# Patient Record
Sex: FEMALE | Race: BLACK OR AFRICAN AMERICAN
[De-identification: names, ages, dates, MRNs, and addresses within clinical notes are randomized per-mention and may not be internally consistent; named-entity substitution may affect disease eponyms.]

---

## 2017-03-26 ENCOUNTER — HOSPITAL ENCOUNTER (EMERGENCY)
Dept: HOSPITAL 74 - JER | Age: 22
Discharge: HOME | End: 2017-03-26
Payer: COMMERCIAL

## 2017-03-26 VITALS — HEART RATE: 79 BPM | SYSTOLIC BLOOD PRESSURE: 130 MMHG | DIASTOLIC BLOOD PRESSURE: 77 MMHG

## 2017-03-26 VITALS — BODY MASS INDEX: 32.5 KG/M2

## 2017-03-26 VITALS — TEMPERATURE: 98.1 F

## 2017-03-26 DIAGNOSIS — R06.02: Primary | ICD-10-CM

## 2017-03-26 LAB
ALBUMIN SERPL-MCNC: 3.6 G/DL (ref 3.4–5)
ALP SERPL-CCNC: 71 U/L (ref 45–117)
ALT SERPL-CCNC: 149 U/L (ref 12–78)
ANION GAP SERPL CALC-SCNC: 6 MMOL/L (ref 8–16)
APPEARANCE UR: CLEAR
AST SERPL-CCNC: 48 U/L (ref 15–37)
BASOPHILS # BLD: 0.6 % (ref 0–2)
BILIRUB SERPL-MCNC: 0.2 MG/DL (ref 0.2–1)
BILIRUB UR STRIP.AUTO-MCNC: NEGATIVE MG/DL
CALCIUM SERPL-MCNC: 8.4 MG/DL (ref 8.5–10.1)
CO2 SERPL-SCNC: 26 MMOL/L (ref 21–32)
COLOR UR: (no result)
CREAT SERPL-MCNC: 0.8 MG/DL (ref 0.55–1.02)
DEPRECATED RDW RBC AUTO: 15.5 % (ref 11.6–15.6)
EOSINOPHIL # BLD: 10.2 % (ref 0–4.5)
GLUCOSE SERPL-MCNC: 88 MG/DL (ref 74–106)
KETONES UR QL STRIP: NEGATIVE
LEUKOCYTE ESTERASE UR QL STRIP.AUTO: NEGATIVE
MCH RBC QN AUTO: 25.4 PG (ref 25.7–33.7)
MCHC RBC AUTO-ENTMCNC: 31.9 G/DL (ref 32–36)
MCV RBC: 79.6 FL (ref 80–96)
NEUTROPHILS # BLD: 37.5 % (ref 42.8–82.8)
NITRITE UR QL STRIP: NEGATIVE
PH UR: 6 [PH] (ref 5–8)
PLATELET # BLD AUTO: 305 K/MM3 (ref 134–434)
PMV BLD: 8.4 FL (ref 7.5–11.1)
PROT SERPL-MCNC: 7.5 G/DL (ref 6.4–8.2)
PROT UR QL STRIP: NEGATIVE
PROT UR QL STRIP: NEGATIVE
RBC # UR STRIP: NEGATIVE /UL
SP GR UR: 1.02 (ref 1–1.03)
TSH SERPL-ACNC: 0.6 UIU/ML (ref 0.36–3.74)
UROBILINOGEN UR STRIP-MCNC: NEGATIVE E.U./DL (ref 0.2–1)
WBC # BLD AUTO: 5.3 K/MM3 (ref 4–10)

## 2017-03-26 NOTE — PDOC
[id number]   100 


 


 03/26/17 06:35  03/26/17 06:35  03/26/17 06:35  03/26/17 06:35  03/26/17 06:35














- Physical Exam


General Appearance: Yes: Nourished, Appropriately Dressed.  No: Apparent 

Distress


HEENT: positive: Normal Voice


Neck: positive: Supple


Respiratory/Chest: positive: Lungs Clear, Normal Breath Sounds.  negative: 

Respiratory Distress


Cardiovascular: positive: Regular Rate, S1, S2


Gastrointestinal/Abdominal: positive: Soft.  negative: Tender


Integumentary: positive: Dry, Warm


Neurologic: positive: Fully Oriented, Alert, Normal Mood/Affect





<Oc Albarran - Last Filed: 03/26/17 08:29>





- Vital Signs


 Last Vital Signs











Temp Pulse Resp BP Pulse Ox


 


 98.1 F   79   17   130/77   99 


 


 03/26/17 08:33  03/26/17 08:33  03/26/17 08:33  03/26/17 08:33  03/26/17 08:33














<Jamey Victoria - Last Filed: 03/27/17 05:20>





**Heart Score/ECG Review





- ECG Intrepretation


Comment:: 





03/26/17 08:05


Twelve-lead EKG was performed and reviewed by me. There is normal sinus rhythm 

with a normal rate. The axis is normal. The intervals are normal. There are no 

ST or T wave abnormalities.





Impression: Normal twelve-lead EKG





<Oc Albarran - Last Filed: 03/26/17 08:29>





ED Treatment Course





- LABORATORY


CBC & Chemistry Diagram: 


 03/26/17 07:58





 03/26/17 07:58





<Oc Albarran - Last Filed: 03/26/17 08:29>





- LABORATORY


CBC & Chemistry Diagram: 


 03/26/17 07:58





 03/26/17 07:58





- ADDITIONAL ORDERS


Additional order review: 


 











  03/26/17





  07:58


 


RBC  4.90  D


 


MCV  79.6 L


 


MCHC  31.9 L


 


RDW  15.5  D


 


MPV  8.4


 


Neutrophils %  37.5 L D


 


Lymphocytes %  40.8 H D


 


Monocytes %  10.9 H


 


Eosinophils %  10.2 H D


 


Basophils %  0.6














<Jamey Victoria - Last Filed: 03/27/17 05:20>





Medical Decision Making





- Medical Decision Making


03/26/17 07:39





03/26/17 06:57


Received S.O from night NP





22 yo F, no sig p,hx, here w/ chronic complaints including sob since depot shot 

3 months ago, states sob is worse at nights and that her BF has had to wake her 

up from sleep many times 2/2 breathing cessation per pt. Also report heart 

skipping beat and dizziness intermittently. Seeking medical care for first time 

now because sxs worsened yesterday. For unclear reasons has not f/u with a PMD 

for sxs. pt well gene and stable w/ unremarkable exam. EKG ordered by night team

, will get basic labs and TSH. Anticipate discharge w/ PMD f/u to eval for 

possible sleep apnea (pt mildly overweight). Will also refer to pt's gyn to 

discuss possible rxn to depot given hpi








03/26/17 08:05








03/26/17 08:35


Patient requesting to be discharged despite chemistry pending.  States she will 

contact ER for results later.  Patient told to follow-up with her GYN and PMD





<Oc Albarran - Last Filed: 03/26/17 08:29>





- Medical Decision Making





03/27/17 05:19


ED attending note: I was available for involvement and discussion in this case 

as needed.





<Jamey Victoria - Last Filed: 03/27/17 05:20>





*DC/Admit/Observation/Transfer





<Oc Albarran - Last Filed: 03/26/17 08:29>





<Jamey Victoria - Last Filed: 03/27/17 05:20>


Diagnosis at time of Disposition: 


 SOB (shortness of breath)





- Discharge Dispostion


Disposition: HOME


Condition at time of disposition: Good





- Referrals


Referrals: 


Fabiana Ragland MD [Primary Care Provider] - 





- Patient Instructions


Additional Instructions: 


Please follow-up at [facility] at [phone] 


Also follow with gyn to discuss your depot

## 2017-03-26 NOTE — EKG
Test Reason : 

Blood Pressure : ***/*** mmHG

Vent. Rate : 066 BPM     Atrial Rate : 066 BPM

   P-R Int : 180 ms          QRS Dur : 088 ms

    QT Int : 412 ms       P-R-T Axes : 039 059 035 degrees

   QTc Int : 431 ms

 

NORMAL SINUS RHYTHM WITH SINUS ARRHYTHMIA

NORMAL ECG

WHEN COMPARED WITH ECG OF 24-APR-2014 10:24,

VENT. RATE HAS DECREASED BY  42 BPM

Confirmed by AGUSTIN ABRAHAM MD ([id number]) on 3/26/2017 10:28:22 PM

 

Referred By:             Confirmed By:AGUSTIN ABRAHAM MD

## 2018-05-17 ENCOUNTER — HOSPITAL ENCOUNTER (EMERGENCY)
Dept: HOSPITAL 74 - JER | Age: 23
Discharge: HOME | End: 2018-05-17
Payer: COMMERCIAL

## 2018-05-17 VITALS — DIASTOLIC BLOOD PRESSURE: 58 MMHG | SYSTOLIC BLOOD PRESSURE: 120 MMHG | TEMPERATURE: 98.8 F | HEART RATE: 84 BPM

## 2018-05-17 VITALS — BODY MASS INDEX: 37.4 KG/M2

## 2018-05-17 DIAGNOSIS — K08.89: Primary | ICD-10-CM

## 2018-05-17 NOTE — PDOC
Rapid Medical Evaluation


Time Seen by Provider: 05/17/18 17:30


Medical Evaluation: 


 Allergies











Allergy/AdvReac Type Severity Reaction Status Date / Time


 


No Known Drug Allergies Allergy   Verified 03/26/17 06:42


 


Black eyed peas Allergy Mild Rash Uncoded 03/26/17 06:42











05/17/18 17:30


The patient presents with a chief complaint of: Toothache for three days. Pt. 

states she was seen at her dentist and was diagnosed with an impacted R lower 

wisdom tooth. Started on amoxicillin.  States that the gum is swollen and it 

hurts to swallow. Denies fevers, chills, SOB, n/v/d.





Pertinent physical exam findings:  AAOX3 sitting on chair. Ambulatory, in no 

respiratory distress, Posterior oropharynx with no erythema. Tonsils without 

erthema or exudate. No uvular deviation. R lower wisdom tooth impacted with 

small fluctuance, possible abscess. Very tender to the touch


I have ordered the following: Tylenol with codeine for pain with relief of 

symptoms





Pt. is a 24 y/o F with no PMH who presents to the ED with dental pain for three 

days. Was evaluated at her dentist office however, they were unable to pull the 

tooth today. Sent RX for tylenol with codeine. Will d./c home with follow up to 

[facility]. Pt. is going there now. Return precautions given. Pt. 

understands all dc instructions and all questions were answered.














**Discharge Disposition





- Diagnosis


 Pain, dental








- Discharge Dispostion


Disposition: HOME


Condition at time of disposition: Stable


Decision to Admit order: No





- Prescriptions


Prescriptions: 


Acetaminophen W/ Codeine Liq [Tylenol W/Codeine Oral Solution -] 5 ml PO Q6H 

PRN #120 ml MDD 4


 PRN Reason: Pain





- Referrals





- Patient Instructions


Printed Discharge Instructions:  DI for Dental Pain


Additional Instructions: 


You were evaluated for dental pain today.


Please go to [facility] now for further evaluation of your 

pain. Information below. 


Take the antibiotics as previously prescribed.


You may take Tylenol with codeine as needed for pain. Do not drive after taking 

this medication as it may make you sleepy.





Return to the ED if you develop fevers, chills, worsening pain, headaches or 

have any changes in your symptoms.





[facility]


[address] [place] [address], [id number] [phone] 





- Post Discharge Activity


Work/School Note:  Back to Work

## 2018-05-24 ENCOUNTER — HOSPITAL ENCOUNTER (EMERGENCY)
Dept: HOSPITAL 74 - JERFT | Age: 23
Discharge: HOME | End: 2018-05-24
Payer: COMMERCIAL

## 2018-05-24 VITALS — TEMPERATURE: 98 F | SYSTOLIC BLOOD PRESSURE: 118 MMHG | DIASTOLIC BLOOD PRESSURE: 59 MMHG | HEART RATE: 69 BPM

## 2018-05-24 VITALS — BODY MASS INDEX: 37.4 KG/M2

## 2018-05-24 DIAGNOSIS — Z98.818: ICD-10-CM

## 2018-05-24 DIAGNOSIS — K08.89: Primary | ICD-10-CM

## 2018-05-24 PROCEDURE — 3E0233Z INTRODUCTION OF ANTI-INFLAMMATORY INTO MUSCLE, PERCUTANEOUS APPROACH: ICD-10-PCS

## 2018-05-24 NOTE — PDOC
History of Present Illness





- General


Chief Complaint: Pain


Stated Complaint: PAIN/ HEAD, MOUTH


Time Seen by Provider: 18 11:30


History Source: Patient


Exam Limitations: No Limitations





- History of Present Illness


Initial Comments: 





18 11:43


Patient is a 23-year-old female with no past medical history who presents 

emergency Department with right lower tooth pain. Patient was evaluated in the 

emergency department on 18 with dental pain by myself. Patient had 

impacted right lower wisdom tooth at that time. Patient was sent to the dental 

urgent care for further evaluation of her tooth pain. She had three wisdom 

teeth removed at that time on 18. Since then she has had worsening dental 

pain and headache. Denies fevers, chills, n/v/d. Still taking her amoxicllin.





Past History





- Travel


Traveled outside of the country in the last 30 days: No


Close contact w/someone who was outside of country & ill: No





- Past Medical History


Allergies/Adverse Reactions: 


 Allergies











Allergy/AdvReac Type Severity Reaction Status Date / Time


 


No Known Drug Allergies Allergy   Verified 18 11:09


 


Black eyed peas Allergy Mild Rash Uncoded 18 11:09











Home Medications: 


Ambulatory Orders





Acetaminophen W/ Codeine Liq [Tylenol W/Codeine Oral Solution -] 5 ml PO Q6H 

PRN #120 ml MDD 4 18 


Acetaminophen W/ Codeine Liq [Tylenol W/Codeine Oral Solution -] 5 ml PO Q6H #

200 ml MDD 4 18 








Anemia: Yes


Asthma: Yes


Cancer: No


Cardiac Disorders: No


COPD: No


Diabetes: No


HTN: No


Seizures: No


Thyroid Disease: No





- Reproductive History


 (#): 2


Para: 1


Therapeutic (s) & number: Yes (1)





- Immunization History


Immunization Up to Date: Yes





- Suicide/Smoking/Psychosocial Hx


Smoking Status: No


Smoking History: Never smoked


Have you smoked in the past 12 months: No


Number of Cigarettes Smoked Daily: 0


Information on smoking cessation initiated: No


Hx Alcohol Use: No


Drug/Substance Use Hx: No


Substance Use Type: None


Hx Substance Use Treatment: No





**Review of Systems





- Review of Systems


Able to Perform ROS?: Yes


Comments:: 





18 11:33


CONSTITUTIONAL: 


Absent: fever, chills, diaphoresis, generalized weakness, malaise, loss of 

appetite


HEENT: 


Present: dental pain Absent: rhinorrhea, nasal congestion, throat pain, throat 

swelling, difficulty swallowing, mouth swelling, ear pain, eye pain, visual 

Changes


SKIN: 


Absent: rash, itching, pallor


NEUROLOGIC: 


Present: headache Absent: headache, focal weakness or paresthesias, dizziness, 

unsteady gait, seizure, mental status changes, bladder or bowel incontinence








Is the patient limited English proficient: No





*Physical Exam





- Vital Signs


 Last Vital Signs











Temp Pulse Resp BP Pulse Ox


 


 98.0 F   69   16   118/59   100 


 


 18 11:09  18 11:09  18 11:09  18 11:09  18 11:09














- Physical Exam


Comments: 





18 12:04


GENERAL:


Well developed, well nourished. Awake and alert. No acute distress.


HEENT:


Normocephalic, atraumatic. PERRLA, EOMI. No conjunctival pallor. Sclera are non-

icteric. Moist mucous membranes. Oropharynx is clear. Still with swelling to 

the R lower gum near the wisdom tooth site. Unable to appreciate any socket.


NECK: 


Supple. Full ROM. No JVD. Carotid pulses 2+ and symmetric, without bruits. No 

thyromegaly. No lymphadenopathy.


SKIN: 


Warm and dry. Normal capillary refill. No rashes. No jaundice. 


NEUROLOGICAL: 


Alert, awake, appropriate. Cranial nerves 2-12 intact. No deficits to light 

touch and temperature in face, upper extremities and lower extremities. No 

motor deficits in the in face, upper extremities and lower extremities. 

Normoreflexic in the upper and lower extremities. Normal speech. Toes are down-

going bilaterally. Gait is normal without ataxia.








Medical Decision Making





- Medical Decision Making





18 12:15


Patient is a 23-year-old female who presents emergency Department with 

increasing dental pain after wisdom tooth extraction 4 days ago. On exam 

difficult to appreciate any sockets due to swelling. Positive halitosis. 

Patient still on antibiotics. Some concern for a dry socket at this time. 

Toradol given for pain. Instructed patient that she needs to return to the 

dentist immediately for further evaluation. Referral for [facility] 

dental given again. Return precautions given. Patient stands all discharge 

instructions and all questions were answered.





*DC/Admit/Observation/Transfer


Diagnosis at time of Disposition: 


 Pain, dental








- Discharge Dispostion


Disposition: HOME


Condition at time of disposition: Stable


Decision to Admit order: No





- Prescriptions


Prescriptions: 


Acetaminophen W/ Codeine Liq [Tylenol W/Codeine Oral Solution -] 5 ml PO Q6H #

200 ml MDD 4





- Referrals


Referrals: 


Azael Obrien MD [Primary Care Provider] - 





- Patient Instructions


Printed Discharge Instructions:  DI for Dental Pain


Additional Instructions: 


Your evaluated for your dental pain today.


It is very important that she follow-up with a dentist as soon as possible.


Please use warm water salt gargles to help clean out the mouth.


You may take Tylenol with codeine every 6 hours as needed for pain.





Return to emergency department if you have worsening pain, fevers, chills, or 

have any changes in your symptoms.





Dental urgent care


Address: [address] [place], [place] [address]


Phone: [phone]





- Post Discharge Activity


Forms/Work/School Notes:  Back to Work

## 2018-09-29 ENCOUNTER — HOSPITAL ENCOUNTER (EMERGENCY)
Dept: HOSPITAL 74 - JER | Age: 23
Discharge: HOME | End: 2018-09-29
Payer: COMMERCIAL

## 2018-09-29 VITALS — BODY MASS INDEX: 36.6 KG/M2

## 2018-09-29 VITALS — HEART RATE: 69 BPM | SYSTOLIC BLOOD PRESSURE: 109 MMHG | DIASTOLIC BLOOD PRESSURE: 65 MMHG

## 2018-09-29 VITALS — TEMPERATURE: 98 F

## 2018-09-29 DIAGNOSIS — Z87.09: ICD-10-CM

## 2018-09-29 DIAGNOSIS — E86.0: ICD-10-CM

## 2018-09-29 DIAGNOSIS — G40.909: ICD-10-CM

## 2018-09-29 DIAGNOSIS — K29.70: Primary | ICD-10-CM

## 2018-09-29 LAB
ALBUMIN SERPL-MCNC: 3.5 G/DL (ref 3.4–5)
ALP SERPL-CCNC: 47 U/L (ref 45–117)
ALT SERPL-CCNC: 32 U/L (ref 13–61)
ANION GAP SERPL CALC-SCNC: 10 MMOL/L (ref 8–16)
APPEARANCE UR: CLEAR
AST SERPL-CCNC: 21 U/L (ref 15–37)
BASOPHILS # BLD: 0.6 % (ref 0–2)
BILIRUB SERPL-MCNC: 0.3 MG/DL (ref 0.2–1)
BILIRUB UR STRIP.AUTO-MCNC: NEGATIVE MG/DL
BUN SERPL-MCNC: 12 MG/DL (ref 7–18)
CALCIUM SERPL-MCNC: 9 MG/DL (ref 8.5–10.1)
CHLORIDE SERPL-SCNC: 108 MMOL/L (ref 98–107)
CO2 SERPL-SCNC: 24 MMOL/L (ref 21–32)
COLOR UR: YELLOW
CREAT SERPL-MCNC: 0.7 MG/DL (ref 0.55–1.3)
DEPRECATED RDW RBC AUTO: 12.8 % (ref 11.6–15.6)
EOSINOPHIL # BLD: 5.6 % (ref 0–4.5)
GLUCOSE SERPL-MCNC: 80 MG/DL (ref 74–106)
HCT VFR BLD CALC: 39.9 % (ref 32.4–45.2)
HGB BLD-MCNC: 12.9 GM/DL (ref 10.7–15.3)
KETONES UR QL STRIP: NEGATIVE
LEUKOCYTE ESTERASE UR QL STRIP.AUTO: NEGATIVE
LIPASE SERPL-CCNC: 223 U/L (ref 73–393)
LYMPHOCYTES # BLD: 49.8 % (ref 8–40)
MCH RBC QN AUTO: 27.3 PG (ref 25.7–33.7)
MCHC RBC AUTO-ENTMCNC: 32.2 G/DL (ref 32–36)
MCV RBC: 84.7 FL (ref 80–96)
MONOCYTES # BLD AUTO: 9.9 % (ref 3.8–10.2)
NEUTROPHILS # BLD: 34.1 % (ref 42.8–82.8)
NITRITE UR QL STRIP: NEGATIVE
PH UR: 6 [PH] (ref 5–8)
PLATELET # BLD AUTO: 272 K/MM3 (ref 134–434)
PMV BLD: 9.8 FL (ref 7.5–11.1)
POTASSIUM SERPLBLD-SCNC: 3.9 MMOL/L (ref 3.5–5.1)
PROT SERPL-MCNC: 7.3 G/DL (ref 6.4–8.2)
PROT UR QL STRIP: NEGATIVE
PROT UR QL STRIP: NEGATIVE
RBC # BLD AUTO: 4.72 M/MM3 (ref 3.6–5.2)
SODIUM SERPL-SCNC: 141 MMOL/L (ref 136–145)
SP GR UR: 1.03 (ref 1–1.03)
UROBILINOGEN UR STRIP-MCNC: 2 MG/DL (ref 0.2–1)
WBC # BLD AUTO: 5.5 K/MM3 (ref 4–10)

## 2018-09-29 PROCEDURE — 3E033GC INTRODUCTION OF OTHER THERAPEUTIC SUBSTANCE INTO PERIPHERAL VEIN, PERCUTANEOUS APPROACH: ICD-10-PCS | Performed by: EMERGENCY MEDICINE

## 2018-09-29 PROCEDURE — 3E033NZ INTRODUCTION OF ANALGESICS, HYPNOTICS, SEDATIVES INTO PERIPHERAL VEIN, PERCUTANEOUS APPROACH: ICD-10-PCS | Performed by: EMERGENCY MEDICINE

## 2018-09-29 NOTE — PDOC
History of Present Illness





- General


Chief Complaint: Seizure


Stated Complaint: SEIZURES





- History of Present Illness


Initial Comments: 





18 19:09


22 y/o F w/ PMH of asthma, scoliosis s/p corrective surgery 2005, c-sections x3

, gestational DM, seizures (psychogenic?), p/w frontal HA, n/v, and burning 

epigastric pain x1d. Pt sxs started this morning. pt endorses nausea, 

lightheadedness, and 4 episodes of brownish vomit (non bloody) w/ metallic and 

acidic taste in 4th episode. pt did not eat that much today and says she had 

some coffee this morning. pt also endorses some mild pressure feeling in her 

chest substernaly radiating to epigastrium, pt says this does not feel like her 

heart burn. pt decided  to call 911 bec she felt like she was going to have a 

seizure. Pt has hx of seizures and says she has been w/u at [facility] w/ EEG and 

MRI which were normal. Her neurologist told her it may be psychogenic. Pt says  

usually associated w/ stress and that she has been more stressed recently. 





Of note pt endorses some blurry vision, polyuria, and increase in thirst during 

the past week.





Denies fevers, diarrhea, numbness, tingling, dysuria, hematuria, blood in stools

, cough, chills, recent travel





PMH: PCP Azael Obrien, Neurologist Dr. Santiago at [facility] [address]. 1st 

seizure 5 yrs ago, last seizure 4 mo ago. 


Seizure episodes consist of foaming at mouth tounge biting whole body shaking/

tensing up. one time had incontinence





Meds: birth control pill


SH: inocenciaana, 1/2 pk/d for 6mo now down to 1-2 cig/day. denies etoh


FH: DM, seizure (cousin) 











18 20:59


pt tolerating PO


pt is safe and stable for discharge





Past History





- Past Medical History


Allergies/Adverse Reactions: 


 Allergies











Allergy/AdvReac Type Severity Reaction Status Date / Time


 


No Known Drug Allergies Allergy   Verified 18 17:38


 


Black eyed peas Allergy Mild Rash Uncoded 18 17:38











Home Medications: 


Ambulatory Orders





NK [No Known Home Medication]  18 








Anemia: Yes


Asthma: Yes


Cancer: No


Cardiac Disorders: No


COPD: No


Diabetes: No


HTN: No


Seizures: Yes


Thyroid Disease: No





- Reproductive History


 (#): 2


Para: 1


Therapeutic (s) & number: Yes (1)





- Immunization History


Immunization Up to Date: Yes





- Suicide/Smoking/Psychosocial Hx


Smoking Status: No


Smoking History: Current every day smoker


Have you smoked in the past 12 months: No


Number of Cigarettes Smoked Daily: 2


Information on smoking cessation initiated: No


Hx Alcohol Use: No


Drug/Substance Use Hx: No


Substance Use Type: None


Hx Substance Use Treatment: No





**Review of Systems





- Review of Systems


Constitutional: Yes: See HPI


HEENTM: Yes: See HPI


Respiratory: Yes: See HPI


Cardiac (ROS): Yes: See HPI


ABD/GI: Yes: See HPI


: Yes: See HPI


Musculoskeletal: Yes: See HPI


Integumentary: Yes: See HPI


Neurological: Yes: See HPI


Endocrine: Yes: See HPI


Hematologic/Lymphatic: Yes: See HPI





*Physical Exam





- Vital Signs


 Last Vital Signs











Temp Pulse Resp BP Pulse Ox


 


 98 F   59 L  18   109/58 L  100 


 


 18 17:38  18 17:38  18 17:38  18 17:38  18 17:38














- Physical Exam


Comments: 





18 19:29


GENERAL: AOX3, NAD   


HEENT: NCAT PERRLA, EOMI, sclera anicteric, oropharynx clear without exudates. 

MMM


NECK: Normal range of motion, no JVD, 


LUNGS: CTAB


HEART: RRR, normal S1 and S2 without M/R/G


ABDOMEN: Soft, ND, TTP epigastric +BS


MUSCULOSKELETAL: FROM 


UPPER EXTREMITIES: 2+ pulses, warm, well-perfused. No cyanosis. No clubbing. No 

peripheral edema.


LOWER EXTREMITIES: 2+ pulses, warm, well-perfused. No peripheral edema. 


NEUROLOGICAL:  Cranial nerves II-XII intact. Normal speech. no sensory 

deficits. stregth intact


SKIN: Warm, dry, normal turgor, no rashes or lesions noted, normal capillary 

refill.





ED Treatment Course





- LABORATORY


CBC & Chemistry Diagram: 


 18 18:48





 18 18:48





- ADDITIONAL ORDERS


Additional order review: 


 











  18





  18:48


 


RBC  4.72


 


MCV  84.7


 


MCHC  32.2


 


RDW  12.8  D


 


MPV  9.8  D


 


Neutrophils %  34.1 L


 


Lymphocytes %  49.8 H D


 


Monocytes %  9.9


 


Eosinophils %  5.6 H


 


Basophils %  0.6














Medical Decision Making





- Medical Decision Making





18 19:45


22 y/o F w/ PMH of asthma, scoliosis s/p corrective surgery 2005, c-sections x3

, gestational DM, seizures (psychogenic?), p/w frontal HA, n/v, and burning 

epigastric pain x1d.





Ddx: psychogenic seizure prodrome vs dehydration vs gastroenteritis vs 

pancreatitis vs DM vs MI





-CBC, CMP, lipase, UA, EKG


-IVF bolus


-IV reglan, pepcid, tylenol


-pregnancy test











18 20:51


labs unremarkable


neg preg


UA neg


pt feeling better and requesting food. 








*DC/Admit/Observation/Transfer


Diagnosis at time of Disposition: 


 Dehydration, Gastritis








- Discharge Dispostion


Disposition: HOME


Condition at time of disposition: Stable





- Referrals


Referrals: 


Azael Obrien MD [Primary Care Provider] - 





- Patient Instructions


Additional Instructions: 


you were seen in the ER for headache, nausea, vomiting, and abdominal pain.





your labs were normal





We gave you fluids, Tylenol, and anti nausea and acid medications which helped 

relieve your symptoms. 





Please avoid spicy foods and coffee, as this may worsen your symptoms


Please take tums, maalox, and pepcid for heart burn or stomach upset





Please follow up with your primary care physician within 1 week. 





If you experience worsening of headache, nausea, vomiting, and abdominal pain 

or fevers, chills, blood in stools, blood in vomit, diarrhea, chest pain, 

shortness of breath, or seizures, please come back to the ER or call 911.  





- Post Discharge Activity

## 2018-09-29 NOTE — PDOC
Attending Attestation





- HPI


HPI: 





18 20:37





The patient is a 23 year old female with a significant PMH of  (x3), 

asthma and seizures who presents to the emergency department with seizure-like 

symptoms earlier today. The patient states that she was at home earlier today 

when she started to experience some epigastric pain, headache, and  

lightheadedness. She states that she has had seizures in the past and, these 

symptoms were similar to those she usually has before she experiences her 

seizure. The patient also reports some dull chest pressure that radiates to her 

epigastric region. She states that she has been experiencing some increased 

thirst and urination this week. She denies any other symptoms. She denies any 

fever, chills, nausea, vomiting, diarrhea, constipation or other urinary 

symptoms. She denies any shortness of breath,or dizziness. The patient denies 

any other complaints.  


 


PCP: Azael Nam








- Physicial Exam


PE: 





18 20:38


GENERAL: Awake, alert, and fully oriented, in no acute distress


HEAD: No signs of trauma


EYES: PERRLA, EOMI, sclera anicteric, conjunctiva clear


ENT: Auricles normal inspection, hearing grossly normal, nares patent, 

oropharynx clear without exudates. Moist mucosa


NECK: Normal ROM, supple, no lymphadenopathy, JVD, or masses


LUNGS: Breath sounds equal, clear to auscultation bilaterally.  No wheezes, and 

no crackles


HEART: Regular rate and rhythm, normal S1 and S2, no murmurs, rubs or gallops


ABDOMEN: (+)tender to palpation.Soft, normoactive bowel sounds.  No guarding, 

no rebound.  No masses


EXTREMITIES: Normal range of motion, no edema.  No clubbing or cyanosis. No 

cords, erythema, or tenderness


NEUROLOGICAL: Cranial nerves II through XII grossly intact.  Normal speech, 

normal gait


SKIN: Warm, Dry, normal turgor, no rashes or lesions noted.








Documentation prepared by Alan Hardy, acting as medical scribe for Rufina Hernandez MD.








<Alan Hardy - Last Filed: 18 20:37>





- Resident


Resident Name: Stanley Mejia





- ED Attending Attestation


I have performed the following: I have examined & evaluated the patient, The 

case was reviewed & discussed with the resident, I agree w/resident's findings 

& plan





- Medical Decision Making





18 20:01


Pt will have labs and hydration and meds for nausea and headache. 


She will be discharged if all labs are normal.


18 20:58


Labs normal; pt feels better; she was hydrated and she ate some food; Pt is 

ready to go home.





<Rufina Hernandez - Last Filed: 18 21:00>

## 2018-10-01 NOTE — EKG
Test Reason : 

Blood Pressure : ***/*** mmHG

Vent. Rate : 056 BPM     Atrial Rate : 056 BPM

   P-R Int : 166 ms          QRS Dur : 080 ms

    QT Int : 468 ms       P-R-T Axes : 029 058 043 degrees

   QTc Int : 451 ms

 

SINUS BRADYCARDIA WITH SINUS ARRHYTHMIA

EARLY REPOLARIZATION

OTHERWISE NORMAL ECG

WHEN COMPARED WITH ECG OF 26-MAR-2017 07:29,

NO SIGNIFICANT CHANGE WAS FOUND

Confirmed by AGUSTIN ABRAHAM MD ([id number]) on 10/1/2018 6:29:25 AM

 

Referred By:             Confirmed By:AGUSTIN ABRAHAM MD

## 2019-01-21 ENCOUNTER — HOSPITAL ENCOUNTER (EMERGENCY)
Dept: HOSPITAL 74 - JERFT | Age: 24
Discharge: HOME | End: 2019-01-21
Payer: COMMERCIAL

## 2019-01-21 VITALS — HEART RATE: 101 BPM | DIASTOLIC BLOOD PRESSURE: 62 MMHG | SYSTOLIC BLOOD PRESSURE: 138 MMHG

## 2019-01-21 VITALS — TEMPERATURE: 102.1 F

## 2019-01-21 VITALS — BODY MASS INDEX: 38.8 KG/M2

## 2019-01-21 DIAGNOSIS — J11.1: Primary | ICD-10-CM

## 2019-01-21 DIAGNOSIS — Z87.09: ICD-10-CM

## 2019-01-21 DIAGNOSIS — Z86.2: ICD-10-CM

## 2019-01-21 NOTE — PDOC
History of Present Illness





- General


Chief Complaint: Headache


Stated Complaint: HEADACHE/COUGHING


Time Seen by Provider: 19 14:25


History Source: Patient


Exam Limitations: No Limitations





- History of Present Illness


Initial Comments: 





19 14:44


Patient came for evaluation of fevers, chills, body aches that started 

yesterday and progressively worsening. States has a headache that the 

wraparound type headache and feels worse in her sinuses. Has a runny nose and a 

moist nonproductive cough.


Timing/Duration: 24 hours


Severity: mild, moderate


Associated Symptoms: reports: chest pain, fever/chills, headaches, malaise, 

nausea/vomiting





Past History





- Travel


Traveled outside of the country in the last 30 days: No


Close contact w/someone who was outside of country & ill: No





- Past Medical History


Allergies/Adverse Reactions: 


 Allergies











Allergy/AdvReac Type Severity Reaction Status Date / Time


 


No Known Drug Allergies Allergy   Verified 18 17:38


 


Black eyed peas Allergy Mild Rash Uncoded 18 17:38











Home Medications: 


Ambulatory Orders





Oseltamivir Phosphate [Tamiflu -] 75 mg PO BID #10 capsule 19 








Anemia: Yes


Asthma: Yes


Cancer: No


Cardiac Disorders: No


COPD: No


Diabetes: No


HTN: No


Seizures: Yes


Thyroid Disease: No





- Reproductive History


 (#): 2


Para: 1


Therapeutic (s) & number: Yes (1)





- Immunization History


Immunization Up to Date: Yes





- Suicide/Smoking/Psychosocial Hx


Smoking Status: No


Smoking History: Never smoked


Have you smoked in the past 12 months: No


Number of Cigarettes Smoked Daily: 2


Hx Alcohol Use: No


Drug/Substance Use Hx: No


Substance Use Type: None


Hx Substance Use Treatment: No





**Review of Systems





- Review of Systems


Able to Perform ROS?: Yes


Comments:: 





19 14:44





GENERAL: [The child is awake, alert, and appropriately interactive.]


EYES: [The pupils are equal, round, and reactive to light, with clear, 

conjunctiva.but glassy]


NOSE: [The nose with clear drainage


EARS: [The ear canals and tympanic membranes are congested but landmarks easily 

visualed ]


THROAT: [The oropharynx is clear with erythema, no exudates. The mucous 

membranes are moist.]


NECK: [The neck is supple with mildly tender adenopathy, no menigemous]


CHEST: [The lungs are coarse but clear without crackles, or wheezes.]


HEART: [Heart is regular rhythm, with normal S1 and S2, no murmurs.]


ABDOMEN: [The abdomen is soft and nontender with normal bowel sounds. There is 

no organomegaly and no mass. There is no guarding or rebound.]


EXTREMITIES: [Extremities are normal.]


NEURO: [Behavior is normal for age.cranky but easily,m  Tone is normal.]


SKIN: [Skin is unremarkable without rash or swelling. There is no bruising, and 

there are no other signs of injury.]


Is the patient limited English proficient: Yes


Constitutional: Yes: Symptoms Reported, See HPI, Fever, Loss of Appetite, 

Malaise


HEENTM: Yes: Symptoms Reported, See HPI, Nose Congestion


Respiratory: Yes: Symptoms reported, See HPI, Cough


All Other Systems: Reviewed and Negative





*Physical Exam





- Vital Signs


 Last Vital Signs











Temp Pulse Resp BP Pulse Ox


 


 102.1 F H  101 H  20   138/62   99 


 


 19 14:38  19 14:05  19 14:05  19 14:05  19 14:05














- Physical Exam


General Appearance: Yes: Appropriately Dressed, Apparent Distress, Moderate 

Distress





Moderate Sedation





- Procedure Monitoring


Vital Signs: 


Procedure Monitoring Vital Signs











Temperature  102.1 F H  19 14:38


 


Pulse Rate  101 H  19 14:05


 


Respiratory Rate  20   19 14:05


 


Blood Pressure  138/62   19 14:05


 


O2 Sat by Pulse Oximetry (%)  99   19 14:05











*DC/Admit/Observation/Transfer


Diagnosis at time of Disposition: 


 Influenzal acute upper respiratory infection








- Discharge Dispostion


Disposition: HOME


Condition at time of disposition: Stable


Decision to Admit order: No





- Referrals


Referrals: 


Azael Obrien MD [Primary Care Provider] - 





- Patient Instructions


Printed Discharge Instructions:  DI for Influenza -- Adult


Additional Instructions: 


Rest, drink lots of fluids: Teas, water, soups, Pedialyte


Saltwater gargles


Steamy showers/seem to face break up mucus


Old-fashioned treatments help!


Avoid contact with others until fevers and cough resolved as this is very 

contagious


Lots of handwashing and good hygiene





Continue over-the-counter medications for symptomatic relief


Tylenol or Motrin for fever and pain


Take all of Tamiflu as directed: 1 tab every 12 hours for 5 days





Followup with private physician in one to 2 days as needed or if worsening


Return to emergency department for worsened symptoms, fevers, dehydration


Influenza takes between 5 and 7 days for resolution


To not participate in any activity, work, or school until fevers and cough are 

gone for at least one day





- Post Discharge Activity


Forms/Work/School Notes:  Back to Work

## 2019-01-22 ENCOUNTER — HOSPITAL ENCOUNTER (EMERGENCY)
Dept: HOSPITAL 74 - JER | Age: 24
LOS: 1 days | Discharge: HOME | End: 2019-01-23
Payer: COMMERCIAL

## 2019-01-22 VITALS — SYSTOLIC BLOOD PRESSURE: 126 MMHG | DIASTOLIC BLOOD PRESSURE: 68 MMHG | TEMPERATURE: 98.9 F | HEART RATE: 69 BPM

## 2019-01-22 VITALS — BODY MASS INDEX: 30.9 KG/M2

## 2019-01-22 DIAGNOSIS — B34.9: Primary | ICD-10-CM

## 2019-01-22 NOTE — PDOC
*Physical Exam





- Vital Signs


 Last Vital Signs











Temp Pulse Resp BP Pulse Ox


 


 98.9 F   69   17   126/68   100 


 


 01/22/19 21:54  01/22/19 21:54  01/22/19 21:54  01/22/19 21:54  01/22/19 21:54














Medical Decision Making





- Medical Decision Making





01/22/19 23:57


Patient seen by the advanced practice provider under my direct supervision. 

Ancillary testing reviewed as necessary.


I agree with plan as outlined by the advanced practice provider.





*DC/Admit/Observation/Transfer


Diagnosis at time of Disposition: 


 Viral syndrome








- Discharge Dispostion


Disposition: HOME





- Referrals


Referrals: 


Azael Obrien MD [Primary Care Provider] - 





- Patient Instructions


Printed Discharge Instructions:  DI for Common Cold


Additional Instructions: 


drink plenty of fluids 





follow up with your doctor as soon as possible. 











- Post Discharge Activity

## 2019-01-23 NOTE — PDOC
History of Present Illness





- General


Chief Complaint: Cold Symptoms


Stated Complaint: FLU Symptoms


Time Seen by Provider: 19 23:43


History Source: Patient





- History of Present Illness


Initial Comments: 





19 02:16


23 year old female with nausea , vomiting and cough. seen in the ED yesterday 

diagnosed with influenza currently on  tamiflu.








19 02:23








Past History





- Past Medical History


Allergies/Adverse Reactions: 


 Allergies











Allergy/AdvReac Type Severity Reaction Status Date / Time


 


No Known Drug Allergies Allergy   Verified 19 21:56


 


Black eyed peas Allergy Mild Rash Uncoded 19 21:56











Home Medications: 


Ambulatory Orders





Oseltamivir Phosphate [Tamiflu -] 75 mg PO BID #10 capsule 19 








Anemia: Yes


Asthma: Yes


Cancer: No


Cardiac Disorders: No


COPD: No


Diabetes: No


HTN: No


Seizures: Yes


Thyroid Disease: No





- Reproductive History


 (#): 2


Para: 1


Therapeutic (s) & number: Yes (1)





- Immunization History


Immunization Up to Date: Yes





- Suicide/Smoking/Psychosocial Hx


Smoking Status: No


Smoking History: Never smoked


Have you smoked in the past 12 months: No


Number of Cigarettes Smoked Daily: 2


Information on smoking cessation initiated: No


Hx Alcohol Use: No


Drug/Substance Use Hx: No


Substance Use Type: None


Hx Substance Use Treatment: No





**Review of Systems





- Review of Systems


Able to Perform ROS?: Yes


Is the patient limited English proficient: No


Constitutional: Yes: Chills


HEENTM: Yes: Nose Congestion, Throat Pain


Respiratory: Yes: Cough


ABD/GI: Yes: Nausea, Vomiting





*Physical Exam





- Vital Signs


 Last Vital Signs











Temp Pulse Resp BP Pulse Ox


 


 98.9 F   69   17   126/68   100 


 


 19 21:54  19 21:54  19 21:54  19 21:54  19 21:54














- Physical Exam


General Appearance: Yes: Appropriately Dressed


HEENT: positive: Tonsillar Erythema


Neck: positive: Normal Thyroid, Supple


Respiratory/Chest: positive: Lungs Clear, Normal Breath Sounds


Cardiovascular: positive: Regular Rhythm, Regular Rate


Gastrointestinal/Abdominal: positive: Normal Bowel Sounds, Soft.  negative: 

Tender





Moderate Sedation





- Procedure Monitoring


Vital Signs: 


Procedure Monitoring Vital Signs











Temperature  98.9 F   19 21:54


 


Pulse Rate  69   19 21:54


 


Respiratory Rate  17   19 21:54


 


Blood Pressure  126/68   19 21:54


 


O2 Sat by Pulse Oximetry (%)  100   19 21:54











*DC/Admit/Observation/Transfer


Diagnosis at time of Disposition: 


 Viral syndrome








- Discharge Dispostion


Disposition: HOME





- Referrals


Referrals: 


Azael Obrien MD [Primary Care Provider] - 





- Patient Instructions


Printed Discharge Instructions:  DI for Common Cold


Additional Instructions: 


drink plenty of fluids 





follow up with your doctor as soon as possible. 











- Post Discharge Activity

## 2019-02-02 ENCOUNTER — HOSPITAL ENCOUNTER (EMERGENCY)
Dept: HOSPITAL 74 - JER | Age: 24
Discharge: HOME | End: 2019-02-02
Payer: COMMERCIAL

## 2019-02-02 VITALS — BODY MASS INDEX: 33.6 KG/M2

## 2019-02-02 VITALS — SYSTOLIC BLOOD PRESSURE: 118 MMHG | DIASTOLIC BLOOD PRESSURE: 72 MMHG | HEART RATE: 80 BPM | TEMPERATURE: 98.6 F

## 2019-02-02 DIAGNOSIS — G40.89: ICD-10-CM

## 2019-02-02 DIAGNOSIS — F41.0: ICD-10-CM

## 2019-02-02 DIAGNOSIS — R11.2: ICD-10-CM

## 2019-02-02 DIAGNOSIS — R51: Primary | ICD-10-CM

## 2019-02-02 LAB
ALBUMIN SERPL-MCNC: 4 G/DL (ref 3.4–5)
ALP SERPL-CCNC: 47 U/L (ref 45–117)
ALT SERPL-CCNC: 37 U/L (ref 13–61)
ANION GAP SERPL CALC-SCNC: 5 MMOL/L (ref 8–16)
AST SERPL-CCNC: 22 U/L (ref 15–37)
BASOPHILS # BLD: 0.5 % (ref 0–2)
BILIRUB SERPL-MCNC: 0.4 MG/DL (ref 0.2–1)
BUN SERPL-MCNC: 15 MG/DL (ref 7–18)
CALCIUM SERPL-MCNC: 9.1 MG/DL (ref 8.5–10.1)
CHLORIDE SERPL-SCNC: 105 MMOL/L (ref 98–107)
CO2 SERPL-SCNC: 29 MMOL/L (ref 21–32)
CREAT SERPL-MCNC: 0.8 MG/DL (ref 0.55–1.3)
DEPRECATED RDW RBC AUTO: 13 % (ref 11.6–15.6)
EOSINOPHIL # BLD: 2.5 % (ref 0–4.5)
GLUCOSE SERPL-MCNC: 90 MG/DL (ref 74–106)
HCT VFR BLD CALC: 38.1 % (ref 32.4–45.2)
HGB BLD-MCNC: 12.5 GM/DL (ref 10.7–15.3)
LYMPHOCYTES # BLD: 31.2 % (ref 8–40)
MAGNESIUM SERPL-MCNC: 2.1 MG/DL (ref 1.8–2.4)
MCH RBC QN AUTO: 27.9 PG (ref 25.7–33.7)
MCHC RBC AUTO-ENTMCNC: 32.9 G/DL (ref 32–36)
MCV RBC: 84.9 FL (ref 80–96)
MONOCYTES # BLD AUTO: 9.2 % (ref 3.8–10.2)
NEUTROPHILS # BLD: 56.6 % (ref 42.8–82.8)
PHOSPHATE SERPL-MCNC: 4.5 MG/DL (ref 2.5–4.9)
PLATELET # BLD AUTO: 339 K/MM3 (ref 134–434)
PMV BLD: 8.8 FL (ref 7.5–11.1)
POTASSIUM SERPLBLD-SCNC: 4 MMOL/L (ref 3.5–5.1)
PROT SERPL-MCNC: 7.9 G/DL (ref 6.4–8.2)
RBC # BLD AUTO: 4.49 M/MM3 (ref 3.6–5.2)
SODIUM SERPL-SCNC: 139 MMOL/L (ref 136–145)
WBC # BLD AUTO: 7.1 K/MM3 (ref 4–10)

## 2019-02-02 PROCEDURE — 3E033GC INTRODUCTION OF OTHER THERAPEUTIC SUBSTANCE INTO PERIPHERAL VEIN, PERCUTANEOUS APPROACH: ICD-10-PCS | Performed by: EMERGENCY MEDICINE

## 2019-02-02 PROCEDURE — 3E0333Z INTRODUCTION OF ANTI-INFLAMMATORY INTO PERIPHERAL VEIN, PERCUTANEOUS APPROACH: ICD-10-PCS | Performed by: EMERGENCY MEDICINE

## 2019-02-02 PROCEDURE — 3E033NZ INTRODUCTION OF ANALGESICS, HYPNOTICS, SEDATIVES INTO PERIPHERAL VEIN, PERCUTANEOUS APPROACH: ICD-10-PCS | Performed by: EMERGENCY MEDICINE

## 2019-02-02 NOTE — PDOC
Attending Attestation





- Resident


Resident Name: Taina Kim





- ED Attending Attestation


I have performed the following: I have examined & evaluated the patient, The 

case was reviewed & discussed with the resident, I agree w/resident's findings 

& plan





- HPI


HPI: 





02/02/19 20:25


Pt comes with anxiety attack.  She is under a great deal of stress because she 

is leaving her job at [employer] and she is leaving [place] and moving back to [facility].


She states that she follows with a neurologist states that she has something 

called psychogenic seizures.





- Physicial Exam


PE: 





02/02/19 20:28


Agree with resident. Normal exam. Normal neuro exam; normal heart and lungs.





- Medical Decision Making





02/02/19 20:28


Pt has normal exam and normal labs and normal vitals.


No need for head CT scan.


Stable for d/c home.

## 2019-02-02 NOTE — PDOC
History of Present Illness





- General


Chief Complaint: Migraine Headache


Stated Complaint: MIGRAINE/VOMITING


Time Seen by Provider: 19 04:52





- History of Present Illness


Initial Comments: 


Tono Wyatt is a 22yo woman with a PMH of psychogenic seizure and report of 

frequent headaches who presents with headache, photophobia, nausea, and 

vomiting suggestive of migraine. 





She reports that she has had frequent headaches for years, but they have been 

more frequent and more severe over the past month. She does note that she had 

influenza a week or two ago (dx in the ED), and she endorses sinus pressure and 

congestion for several weeks. Ms Wyatt has never been evaluated for the 

headaches. She dislikes taking medication, so she generally just lays down and 

tries to "wait it out." She has not taken any pain medication today, and she 

presented to the ED because of several episodes of vomiting and 10/10 head pain 

that did not improve over time. 





Ms Wyatt denies any neurological symptoms other than photophobia. She has no 

neck pain or stiffness and has not had fever since her flu resolved. She does 

report that she has had multiple types of head and brain imaging due to onset 

of seizures in her 20's; most recently she had an MRI brain about 6 months ago. 

She reports that the tests have aways been normal. She was followed by a 

neurologist and diagnosed with psychogenic seizures due to stress, but she 

never mentioned the headaches to the neurologist. 








Past History





- Past Medical History


Allergies/Adverse Reactions: 


 Allergies











Allergy/AdvReac Type Severity Reaction Status Date / Time


 


No Known Drug Allergies Allergy   Verified 19 04:38


 


Black eyed peas Allergy Mild Rash Uncoded 19 04:38











Home Medications: 


Ambulatory Orders





Oseltamivir Phosphate [Tamiflu -] 75 mg PO BID #10 capsule 19 








Anemia: Yes


Asthma: Yes


Cancer: No


Cardiac Disorders: No


COPD: No


Diabetes: No


HTN: No


Seizures: Yes


Thyroid Disease: No





- Reproductive History


 (#): 2


Para: 1


Therapeutic (s) & number: Yes (1)





- Immunization History


Immunization Up to Date: Yes





- Suicide/Smoking/Psychosocial Hx


Smoking Status: No


Smoking History: Never smoked


Have you smoked in the past 12 months: No


Number of Cigarettes Smoked Daily: 2


Information on smoking cessation initiated: No


Hx Alcohol Use: No


Drug/Substance Use Hx: No


Substance Use Type: None


Hx Substance Use Treatment: No





**Review of Systems





- Review of Systems


Comments:: 


General: No fevers, no chills, no weight or appetite change, no malaise


HEENT: No changes in vision, no changes in hearing, no congestion, no sore 

throat. +HA, +sinus pressure, +photophobia


CV: No chest pain, no palpitations, no LE edema


Pulm: No SOB, no cough, no wheezing


GI: +nausea/vomiting, no change in bowel habits, no melena


: No frequency, no urgency, no dysuria


Musc: No back pain, no joint swelling, no recent injury


Skin: No rash, no lesions, no erythema


Endo: No excessive thirst, no heat/cold intolerance


Heme: No unusual bruising or bleeding, no swollen glands


Neuro: No syncope, no numbness/tingling, no focal weakness


Vasc: No claudication


Psych: No recent change in mood, no SI or HI











*Physical Exam





- Vital Signs


 Last Vital Signs











Temp Pulse Resp BP Pulse Ox


 


 98.6 F   80   18   118/72   98 


 


 19 04:38  19 04:38  19 04:38  19 04:38  19 04:38














- Physical Exam


Comments: 


General: Comfortable, no acute distress


HEENT: PERRL, EOMI, MMM, voice normal, normal neck ROM, no LAD. L frontal sinus 

TTP


Cards: RRR, no murmur appreciated


Pulm: Comfortable on room air, clear to auscultation bilaterally


Abd: Soft, nontender, nondistended


Ext: Atraumatic. No LE edema. ROM intact. Strength 5/5 and equal bilaterally


Vasc: Extremities WWP. Palpable radial and pedal pulses bilaterally


Skin: Normal color, no rashes or lesions


Neuro: A&Ox3, CN grossly intact, normal speech, motor/sensory grossly intact 

and symmetric


Psych: Mood appropriate to situation











Moderate Sedation





- Procedure Monitoring


Vital Signs: 


Procedure Monitoring Vital Signs











Temperature  98.6 F   19 04:38


 


Pulse Rate  80   19 04:38


 


Respiratory Rate  18   19 04:38


 


Blood Pressure  118/72   19 04:38


 


O2 Sat by Pulse Oximetry (%)  98   19 04:38











ED Treatment Course





- LABORATORY


CBC & Chemistry Diagram: 


 19 05:12





 19 05:12





Medical Decision Making





- Medical Decision Making





19 04:54


Tono Wyatt is a 22yo woman with a PMH of psychogenic seizure and report of 

frequent headaches who presents with headache, photophobia, nausea, and 

vomiting suggestive of migraine. She reports a history of similar headaches, 

though they have been more frequent recently. She denies any neurological 

symptoms other than the photophobia. 


- Has not taken any medications at home. Will treat w/ IVF, IV acetaminophen, 

reglan


- CBC, CMP, pregnancy test to evaluate for electrolyte abnormalities


- Reports recent brain MRI after onset of psychogenic seizures; most recent 

imaging was 5-6 months ago and was normal per pt. No indication for imaging 

today


- No fevers, meningeal signs 


- Sinus tenderness, and pt reports sinus fullness for several weeks. If she has 

a sinus infection, could explain increase in recent headaches





19 05:31


- Labs pending


- Feels improved after acetaminophen and reglan. No additional vomiting. 





19 06:47


- Labs reviewed, unremarkable. 


- Headache significantly improved. Pregnancy test negative, will give toradol 

for continued mild headache


- Discussed home care, follow up and return precautions w/ Ms Wyatt. Plant o d/c 

home. She understands and agrees with the plan.





Discussed with Dr Hernandez.





Taina Kim


PGY1





*DC/Admit/Observation/Transfer


Diagnosis at time of Disposition: 


 Headache, Vomiting








- Discharge Dispostion


Condition at time of disposition: Stable


Decision to Admit order: No





- Referrals


Referrals: 


Azael Obrien MD [Primary Care Provider] - 


Jahaira Phillips MD [Staff Physician] - 





- Patient Instructions


Printed Discharge Instructions:  DI for Migraine


Additional Instructions: 


Discharge Instructions:


You were seen in the emergency department for a headache, light sensitivity, 

and vomiting. You had blood tests to check for abnormalities or dehydration, 

but your results were normal. Your symptoms improved with IV hydration and pain 

and nausea medications.





Home Care:


- Continue to take any previously prescribed medications


- If you get a headache, you may take 600-800mg ibuprofen (Advil or Motrin). If 

this does not improve your symptoms, you can consider taking 650-1000mg 

acetaminophen (Tylenol) after 1-2 hours. 


- If you are feeling sick and do not have an appetite, make sure you are 

drinking enough fluids to stay hydrated. 


- If you have continued sinus pressure and congestion, especially if you have 

headaches located at the forehead, you can try taking Sudafed in addition to 

the ibuprofen. This is a decongestant that can help relieve sinus pressure, and 

it is available over the counter. 





Follow Up:


- Make an appointment to follow up with your regular doctor within the next 

week. 


- Make an appointment to see your neurologist within the next 1-2 weeks for 

evaluation of your headaches. If you need a new neurologist, you have been 

referred to Dr Phillips. 


- Seek immediate medical care if you have headaches with fever to 101F, 

neurological symptoms (changes in your speech, one-sided weakness), or vomiting 

that prevents you from eating or drinking anything. 





- Post Discharge Activity

## 2019-06-16 ENCOUNTER — HOSPITAL ENCOUNTER (EMERGENCY)
Dept: HOSPITAL 74 - JER | Age: 24
Discharge: HOME | End: 2019-06-16
Payer: COMMERCIAL

## 2019-06-16 VITALS — TEMPERATURE: 97.4 F

## 2019-06-16 VITALS — SYSTOLIC BLOOD PRESSURE: 110 MMHG | DIASTOLIC BLOOD PRESSURE: 92 MMHG | HEART RATE: 80 BPM

## 2019-06-16 VITALS — BODY MASS INDEX: 35.2 KG/M2

## 2019-06-16 DIAGNOSIS — R51: Primary | ICD-10-CM

## 2019-06-16 LAB
ALBUMIN SERPL-MCNC: 3.6 G/DL (ref 3.4–5)
ALP SERPL-CCNC: 52 U/L (ref 45–117)
ALT SERPL-CCNC: 49 U/L (ref 13–61)
ANION GAP SERPL CALC-SCNC: 7 MMOL/L (ref 8–16)
AST SERPL-CCNC: 30 U/L (ref 15–37)
BASOPHILS # BLD: 0.9 % (ref 0–2)
BILIRUB SERPL-MCNC: 0.3 MG/DL (ref 0.2–1)
BUN SERPL-MCNC: 9.6 MG/DL (ref 7–18)
CALCIUM SERPL-MCNC: 8.5 MG/DL (ref 8.5–10.1)
CHLORIDE SERPL-SCNC: 107 MMOL/L (ref 98–107)
CO2 SERPL-SCNC: 28 MMOL/L (ref 21–32)
CREAT SERPL-MCNC: 0.9 MG/DL (ref 0.55–1.3)
DEPRECATED RDW RBC AUTO: 12.8 % (ref 11.6–15.6)
EOSINOPHIL # BLD: 3.8 % (ref 0–4.5)
GLUCOSE SERPL-MCNC: 120 MG/DL (ref 74–106)
HCT VFR BLD CALC: 38.3 % (ref 32.4–45.2)
HGB BLD-MCNC: 12.3 GM/DL (ref 10.7–15.3)
LIPASE SERPL-CCNC: 95 U/L (ref 73–393)
LYMPHOCYTES # BLD: 38.8 % (ref 8–40)
MCH RBC QN AUTO: 27.1 PG (ref 25.7–33.7)
MCHC RBC AUTO-ENTMCNC: 32 G/DL (ref 32–36)
MCV RBC: 84.7 FL (ref 80–96)
MONOCYTES # BLD AUTO: 10.1 % (ref 3.8–10.2)
NEUTROPHILS # BLD: 46.4 % (ref 42.8–82.8)
PLATELET # BLD AUTO: 241 K/MM3 (ref 134–434)
PMV BLD: 9.5 FL (ref 7.5–11.1)
POTASSIUM SERPLBLD-SCNC: 3.8 MMOL/L (ref 3.5–5.1)
PROT SERPL-MCNC: 6.9 G/DL (ref 6.4–8.2)
RBC # BLD AUTO: 4.53 M/MM3 (ref 3.6–5.2)
SODIUM SERPL-SCNC: 142 MMOL/L (ref 136–145)
WBC # BLD AUTO: 6.3 K/MM3 (ref 4–10)

## 2019-06-16 PROCEDURE — 3E033GC INTRODUCTION OF OTHER THERAPEUTIC SUBSTANCE INTO PERIPHERAL VEIN, PERCUTANEOUS APPROACH: ICD-10-PCS | Performed by: EMERGENCY MEDICINE

## 2019-06-16 NOTE — PDOC
History of Present Illness





- General


Chief Complaint: Nausea/Vomiting


Stated Complaint: VOMITING AND DIZZY


Time Seen by Provider: 19 04:21





- History of Present Illness


Initial Comments: 





19 04:23


25 yo F with h/o migraine disorder who p/w HA and vomiting. Mireille reports 

falling asleep (06/15/19) evening with slight low intesnity, bilateral, 

pulsating, bi-temporal headache, asx. w/ photphobia, phonophobia. Endorses 

waking up 2 hours PTA with multiple episodes of NBNB and continuous headache. 

Headache consistent with prior headaches. Has not attempted analgesic control. 





Patient denies aura, scintillating scotomas, neck pain, vision change, 

palpitations, cough, wheezing, orthopena, PND, leg swelling/pain, N/V, F,C, CP, 

SOB, urinary complaints, hematuria, BPR, abdominal pain, diarrhea, constipation

, lightheadedness, weakness, sensory changes.





PMHx: as noted above


ROS: as noted


SHx: Denies Etoh, IVDA, tobacco use


Allergies: NKDA








Past History





- Past Medical History


Allergies/Adverse Reactions: 


 Allergies











Allergy/AdvReac Type Severity Reaction Status Date / Time


 


No Known Drug Allergies Allergy   Verified 19 04:38


 


Black eyed peas Allergy Mild Rash Uncoded 19 04:38











Home Medications: 


Ambulatory Orders





NK [No Known Home Medication]  19 








Anemia: Yes


Asthma: Yes


Cancer: No


Cardiac Disorders: No


COPD: No


Diabetes: No


HTN: No


Seizures: Yes


Thyroid Disease: No





- Reproductive History


 (#): 2


Para: 1


Therapeutic (s) & number: Yes (1)





- Immunization History


Immunization Up to Date: Yes





- Suicide/Smoking/Psychosocial Hx


Smoking Status: No


Smoking History: Never smoked


Have you smoked in the past 12 months: No


Number of Cigarettes Smoked Daily: 2


Hx Alcohol Use: No


Drug/Substance Use Hx: No


Substance Use Type: None


Hx Substance Use Treatment: No





**Review of Systems





- Review of Systems


Comments:: 





19 04:23


GENERAL/CONSTITUTIONAL: No fever or chills. No weakness.


HEAD, EYES, EARS, NOSE AND THROAT: No change in vision. No ear pain or 

discharge. No sore throat.


CARDIOVASCULAR: No chest pain or shortness of breath


RESPIRATORY: No cough, wheezing, or hemoptysis.


GASTROINTESTINAL: No nausea, vomiting, diarrhea or constipation.


GENITOURINARY: No dysuria, frequency, or change in urination.


MUSCULOSKELETAL: No joint or muscle swelling or pain. No neck or back pain.


SKIN: No rash


NEUROLOGIC: +headache. no vertigo, loss of consciousness, or change in strength/

sensation.


ENDOCRINE: No increased thirst. No abnormal weight change


HEMATOLOGIC/LYMPHATIC: No anemia, easy bleeding, or history of blood clots.


ALLERGIC/IMMUNOLOGIC: No hives or skin allergy.














*Physical Exam





- Vital Signs


 Last Vital Signs











Temp Pulse Resp BP Pulse Ox


 


 97.4 F L  85   20   106/90   100 


 


 19 04:05  19 04:05  19 04:05  19 04:05  19 04:05














- Physical Exam


Comments: 





19 04:23


GENERAL: Awake, alert, and fully oriented, in no acute distress


HEAD: No signs of trauma, normocephalic, atraumatic 


EYES: PERRLA, EOMI, sclera anicteric, conjunctiva clear


ENT: Auricles normal inspection, hearing grossly normal, nares patent, 

oropharynx clear without


exudates. Moist mucosa


NECK: Normal ROM, supple, no lymphadenopathy, JVD, or masses


LUNGS: No distress, speaks full sentences, clear to auscultation bilaterally 


HEART: Regular rate and rhythm, normal S1 and S2, no murmurs, rubs or gallops, 

peripheral pulses normal and equal bilaterally. 


ABDOMEN: Soft, nontender, normoactive bowel sounds.  No guarding, no rebound.  

No masses


EXTREMITIES : Normal inspection, Normal range of motion, no edema.  No clubbing 

or cyanosis. 


NEUROLOGICAL: Neg burdinsky, kernig sign. Cranial nerves II through XII grossly 

intact.  Normal speech, normal gait, no focal sensorimotor deficits 


SKIN: Warm, Dry, normal turgor, no rashes or lesions noted














ED Treatment Course





- LABORATORY


CBC & Chemistry Diagram: 


 19 04:30





 19 04:30





Medical Decision Making





- Medical Decision Making





19 04:38


25 yo F with h/o migraine disorder who p/w HA and vomiting. Vitals wnl, AF, A&

OX3. Physical exam unremarkable. Neurologically intatc. Headache gradual onset, 

similar to prior HA, and low intensity at onset. Asx. w/ photo/phonophobia. 

Absent nuchal findings. Denies aura, scintillating scotomas, neck pain, vision 

change, F,C, CP, SOB, urinary complaints, hematuria, BPR, abdominal pain, 

diarrhea, constipation, lightheadedness, weakness, sensory changes. Likely 

migraine headache w/out aura vs. tension headache. Pain control, anti-emetic 

control, reassess. 





ED Course: 


Reglan, Diphenhydramine, NS


19 06:57


 Laboratory Tests











  19





  04:30 04:30


 


WBC  6.3 


 


Hgb  12.3 


 


Hct  38.3 


 


Plt Count  241  D 


 


Sodium   142


 


Potassium   3.8


 


Lipase   95








Patient pain improved following medication, fluids


stable for d/c with return precautions 


advised to f/u with neuro





*DC/Admit/Observation/Transfer


Diagnosis at time of Disposition: 


Headache


Qualifiers:


 Headache type: unspecified Headache chronicity pattern: acute headache 

Intractability: not intractable Qualified Code(s): R51 - Headache








- Discharge Dispostion


Condition at time of disposition: Stable





- Referrals


Referrals: 


Azael Obrien MD [Primary Care Provider] - 


Rodney Casarez MD [Staff Physician] - 





- Patient Instructions


Printed Discharge Instructions:  DI for Migraine


Additional Instructions: 


Please return to the emergency department with any new or worsening symptoms or 

concerns. Please follow up with your primary care physician and neurologist 

within 72 hours.








- Post Discharge Activity

## 2019-06-16 NOTE — PDOC
Attending Attestation





- Resident


Resident Name: Yifan Arguelles





- ED Attending Attestation


I have performed the following: I have examined & evaluated the patient, The 

case was reviewed & discussed with the resident, I agree w/resident's findings 

& plan





- HPI


HPI: 





06/16/19 06:16


Pt comes with headache; she has migraines that present with vomiting.  Her 

vitals are normal.  She is afebrile.


06/16/19 06:17


Pt states that she has been with all her godchildren and she was exposed to 

kids with gastroenteritis. 


She has no other complaints.


06/16/19 06:57


Feeling better and she is ready to go home.


Exam normal. Hydration given.  HA gone. 





- Physicial Exam


PE: 





06/16/19 06:57


Exam completely normal.





- Medical Decision Making





06/16/19 06:17


Chem is normal


06/16/19 06:17


CBC is pending.








06/17/19 02:28


Labs were normal; Pt feeling improved; ready to go home.  No need for CT head 

or any imaging.

## 2023-10-13 NOTE — PDOC
History of Present Illness





- General


Chief Complaint: Shortness of Breath


Stated Complaint: DIFFICULTY BREATHING


Time Seen by Provider: 17 06:20


History Source: Patient


Exam Limitations: No Limitations





- History of Present Illness


Initial Comments: 





17 06:57





20yo Female patient presents to ED c/o trouble breathing since  of this year 

when she received her Depo injection. Patient states she has intermittent SOB 

while sleeping. She states 7 hours ago having difficulty breathing and her 

boyfriend had to wake her up out of her sleep because she was skipping a 

breath. Associated dizziness and headaches. Denies CP, Back pain, Abd pain, n/v/

d, fever, cough, congestion or any other complaints at this time. LNMP: Unknown





PCP: Sandra Ragland.


Timing/Duration: other (Ongoing)


Modifying Factors: worse with: cold therapy, eating, immobilization, medication

, movement, rest, other


Associated Symptoms: reports: headaches, shortness of breath


Aspirin Received prior to arrival: No: no aspirin today, unknown, 81 mg x 1, 81 

mg x 2, 81 mg x 3, 81 mg x 4, 325 mg x 1, provided at home, provided by EMS, 

provided by ED





Past History





- Travel


Traveled outside of the country in the last 30 days: No


Close contact w/someone who was outside of country & ill: No





- Past Medical History


Allergies/Adverse Reactions: 


 Allergies











Allergy/AdvReac Type Severity Reaction Status Date / Time


 


No Known Drug Allergies Allergy   Verified 17 06:42


 


Black eyed peas Allergy Mild Rash Uncoded 17 06:42











Home Medications: 


Ambulatory Orders





NK [No Known Home Medication]  17 








Anemia: Yes


Asthma: Yes


Cancer: No


Cardiac Disorders: No


Diabetes: No


HTN: No


Suicide Attempt (Hx): No


Seizures: No


Thyroid Disease: No


Other medical history: Scoliosis





- Reproductive History


 (#): 2


Para: 1


Therapeutic (s) & number: Yes (1)





- Immunization History


Immunization Up to Date: Yes





- Psycho/Social/Smoking Cessation Hx


Anxiety: No


Suicidal Ideation: No


Smoking Status: No


Smoking History: Never smoked


Have you smoked in the past 12 months: No


Number of Cigarettes Smoked Daily: 0


Information on smoking cessation initiated: No


Hx Alcohol Use: No


Drug/Substance Use Hx: No


Substance Use Type: None


Hx Substance Use Treatment: No





**Review of Systems





- Review of Systems


Able to Perform ROS?: Yes


Is the patient limited English proficient: No


Constitutional: No: Chills, Fever


HEENTM: No: Ear Pain, Nose Congestion, Nose Bleeding, Throat Pain, Difficulty 

Swallowing, Mouth Swelling


Respiratory: Yes: Shortness of Breath.  No: Cough, Orthopnea, Stridor, Wheezing

, Productive cough, Hemoptysis


Cardiac (ROS): No: Chest Pain, Edema, Irregular Heart Rate, Lightheadedness, 

Palpitations, Syncope, Chest Tightness


ABD/GI: No: Diarrhea, Nausea, Vomiting


: No: Burning, Dysuria, Discharge, Frequency, Flank Pain, Hematuria, 

Incontinence, Urgency


Musculoskeletal: No: Back Pain, Joint Pain


Integumentary: No: Erythema, Rash, Sweating


Neurological: Yes: Headache, Dizziness.  No: Numbness, Seizure, Tingling, 

Tremors, Ataxia


All Other Systems: Reviewed and Negative





*Physical Exam





- Vital Signs


 Last Vital Signs











Temp Pulse Resp BP Pulse Ox


 


 98.1 F   75   19   129/82   100 


 


 17 06:35  17 06:35  17 06:35  17 06:35  17 06:35














- Physical Exam


General Appearance: Yes: Nourished, Appropriately Dressed.  No: Apparent 

Distress, Mild Distress, Moderate Distress, Severe Distress


HEENT: positive: EOMI, JELANI, Normal ENT Inspection, Normal Voice, Symmetrical, 

TMs Normal, Pharynx Normal.  negative: Muffled/Hoarse voice, Pharyngeal Erythema

, Tonsillar Exudate, Nasal Congestion, Sinus Tenderness, TM Bulging, TM Dull, 

TM Erythema


Neck: positive: Trachea midline, Normal Thyroid, Supple.  negative: Rigid, 

Decreased range of motion, Stridor, Lymphadenopathy (R), Lymphadenopathy (L)


Respiratory/Chest: positive: Lungs Clear, Normal Breath Sounds.  negative: 

Chest Tender, Respiratory Distress, Accessory Muscle Use, Labored Respiration, 

Rapid RR


Cardiovascular: positive: Regular Rhythm, Regular Rate.  negative: Edema, JVD, 

Murmur


Gastrointestinal/Abdominal: positive: Normal Bowel Sounds, Soft.  negative: 

Increased Bowel Sounds, Distended, Guarding, Rebound, Tenderness


Musculoskeletal: positive: Normal Inspection.  negative: CVA Tenderness, 

Decreased Range of Motion, Muscle Spasm, Vertebral Tenderness


Extremity: positive: Normal Capillary Refill, Normal Inspection, Normal Range 

of Motion.  negative: Pedal Edema, Swelling


Integumentary: positive: Normal Color, Dry, Warm


Neurologic: positive: CNs II-XII NML intact, Fully Oriented, Alert, Normal Mood/

Affect, Normal Response, Motor Strength 5/5





*DC/Admit/Observation/Transfer





- Discharge Dispostion


Condition at time of disposition: Fair Preceptor Attestation:    I discussed the patient with the resident and evaluated the patient in person. I have verified the content of the note, which accurately reflects my assessment of the patient and the plan of care.   Supervising Physician:  Silverio Somers MD.